# Patient Record
Sex: MALE | Race: WHITE | URBAN - METROPOLITAN AREA
[De-identification: names, ages, dates, MRNs, and addresses within clinical notes are randomized per-mention and may not be internally consistent; named-entity substitution may affect disease eponyms.]

---

## 2022-01-04 ENCOUNTER — TELEPHONE (OUTPATIENT)
Dept: DIABETES SERVICES | Age: 22
End: 2022-01-04

## 2022-01-04 NOTE — TELEPHONE ENCOUNTER
Talked with pt regarding insurance coverage. Pt cannot afford the 7990.76 remaining on his deductible or his coinsurance. Will have  send info to Maeve Sampson with [facility].  Will no longer pursue

## 2022-01-05 ENCOUNTER — DOCUMENTATION ONLY (OUTPATIENT)
Dept: CASE MANAGEMENT | Age: 22
End: 2022-01-05

## 2022-03-18 ENCOUNTER — TELEPHONE (OUTPATIENT)
Dept: NUTRITION | Age: 22
End: 2022-03-18

## 2022-03-18 NOTE — TELEPHONE ENCOUNTER
Nutrition Counseling: Contacted pt regarding referral. See notes documented in Nutrition Counseling Referral for details. No further follow-up contact from pt. Will close referral for this office.     [facility]  [phone]

## 2022-07-08 RX ORDER — INSULIN DEGLUDEC INJECTION 100 U/ML
INJECTION, SOLUTION SUBCUTANEOUS
COMMUNITY

## 2022-08-01 ENCOUNTER — CLINICAL DOCUMENTATION (OUTPATIENT)
Dept: ENDOCRINOLOGY | Age: 22
End: 2022-08-01

## 2022-08-01 NOTE — PROGRESS NOTES
Received a fax from [facility] [place] with [employer] Requesting pt's last 2 office notes, last 2 A1c's and sensor/meter download if available. Office notes from 2/21/22, and 1/21/22, Recent labs and A1c has been printed and faxed to Malachi Tuttle at [phone].

## 2022-08-03 ENCOUNTER — PATIENT MESSAGE (OUTPATIENT)
Dept: ENDOCRINOLOGY | Age: 22
End: 2022-08-03

## 2022-08-19 NOTE — TELEPHONE ENCOUNTER
Update from [facility] [place] with [employer]:    Re:  Rakesh Cloudorest [birth year]. we are working w he and parent about a Tandem pump. Father insists insurance should be covering at Tier 1 and they should not owe as much as insurance is quoting. Father is still trying to work with insurance on tier coverage. I just wanted to make you aware pt may not go forward with pump if father cannot get insurance to cover at tier 1.         [facility] [place], MSN, RN, [employer]

## 2022-08-31 NOTE — TELEPHONE ENCOUNTER
Update from Schuyler Roper:    Wanted to update you on patient Sanjana Goodwin [birth year]. His father was/is trying to get coverage for the pump under Tier 1 of his insurance. Per parent, the insurance requires Tandem to process the claim first to get tier 1 coverage. Tandem explained to father, we cannot process the claim first.  Parent requested we cancel the Tandem order because he cannot move forward unless we are able to process the claim the way insurance requests.     Schuyler Roper

## 2022-10-06 ENCOUNTER — TELEPHONE (OUTPATIENT)
Dept: ENDOCRINOLOGY | Age: 22
End: 2022-10-06

## 2022-10-06 NOTE — TELEPHONE ENCOUNTER
Patient left a message stating he would like a call back concerning some abnormal blood sugars. Left message for patient to call back concerning issue or sent message via My Chart.

## 2022-11-07 ENCOUNTER — TELEPHONE (OUTPATIENT)
Dept: ENDOCRINOLOGY | Age: 22
End: 2022-11-07

## 2022-11-07 NOTE — TELEPHONE ENCOUNTER
----- Message from [address], APRN - NP sent at 11/3/2022  1:16 PM EDT -----  Will you please contact this patient. Please ask him what his current dose for insulin is - basal and bolus for each meal/snack for the entire day? I need this information to complete his inisulin pump prescription.      Thank you,    Radha Jones

## 2022-11-08 NOTE — TELEPHONE ENCOUNTER
Left message for patient to call back. Need to know how many units of insulin does he use daily and if he is on a pump.

## 2022-11-09 NOTE — TELEPHONE ENCOUNTER
Patient returned our call and left a message. I was unable to reach him back so another message was left.

## 2022-11-11 ENCOUNTER — SCHEDULED TELEPHONE ENCOUNTER (OUTPATIENT)
Dept: ENDOCRINOLOGY | Age: 22
End: 2022-11-11

## 2022-11-11 DIAGNOSIS — Z96.41 INSULIN PUMP STATUS: ICD-10-CM

## 2022-11-11 DIAGNOSIS — E10.65 TYPE 1 DIABETES MELLITUS WITH HYPERGLYCEMIA (HCC): Primary | ICD-10-CM

## 2022-11-11 PROCEDURE — 99442 PR PHYS/QHP TELEPHONE EVALUATION 11-20 MIN: CPT | Performed by: DIETITIAN, REGISTERED

## 2022-11-11 RX ORDER — INSULIN DEGLUDEC INJECTION 100 U/ML
16 INJECTION, SOLUTION SUBCUTANEOUS EVERY MORNING
Qty: 6 ML | Refills: 11
Start: 2022-11-11

## 2022-11-11 RX ORDER — BLOOD-GLUCOSE TRANSMITTER
EACH MISCELLANEOUS
Qty: 1 EACH | Refills: 3 | Status: SHIPPED | OUTPATIENT
Start: 2022-11-11

## 2022-11-11 RX ORDER — INSULIN DEGLUDEC 200 U/ML
43 INJECTION, SOLUTION SUBCUTANEOUS DAILY
Qty: 9 ML | Refills: 11
Start: 2022-11-11 | End: 2022-11-11 | Stop reason: ALTCHOICE

## 2022-11-11 RX ORDER — BLOOD-GLUCOSE SENSOR
EACH MISCELLANEOUS
Qty: 9 EACH | Refills: 3 | Status: SHIPPED | OUTPATIENT
Start: 2022-11-11

## 2022-11-11 NOTE — PROGRESS NOTES
Autumn You is a 23 y.o. male evaluated via audio-only technology on 11/11/2022 for No chief complaint on file. Assessment & Plan:     Return in about 1 month (around 12/11/2022). 1. Type 1 diabetes mellitus with hyperglycemia (HCC)  A1C is 8.3%. Glycemic control is suboptimal. Patient has not returned to our office since January 2022, due to no insurance and financial stress. He will receive Tandem T-Slim Pump. He will have pump training on 11/15/2022. I have asked him to come in after that to review his settings. --- Dexcom and Humalog vials ordered    - Continuous Blood Gluc Sensor (DEXCOM G6 SENSOR) MISC; Change every 10 days as directed. Dispense: 9 each; Refill: 3  - Continuous Blood Gluc Transmit (DEXCOM G6 TRANSMITTER) MISC; Change every 90 days as directed. Dispense: 1 each; Refill: 3  - insulin lispro (HUMALOG) 100 UNIT/ML injection vial; For Use with Tandem T-Slim Insulin Pump. Max Daily Dose 80 units  Dispense: 80 mL; Refill: 11  - TRESIBA FLEXTOUCH 100 UNIT/ML SOPN; Inject 16 Units into the skin every morning For use only in the event of pump failure emergency  Dispense: 6 mL; Refill: 11    2. Insulin Pump Status:  Tandem T-Slim with Humalog vial  Pump settings: standard pattern- 24 hour total 32 units   Time  0:00  0.6  Carb Ratio  0:00 14      Insulin Sensitivity 53  Target low  120  Active Insulin time 4:00  Max Bolus 25 units                                   Subjective:     Home blood glucose monitoring frequency: 4 times per day    Blood glucose: by verbal review   fasting 200,    AC lunch 100,    AC supper 200,    bedtime 200      Tests or Results Reviewed: (see lab dates below in note) HgbA1C, Cr, GFR, Microalbumin/Cr ratio, Lipid Profile, TSH. History of Present Illness:        Current Diabetes Medications: Tresiba 63 units in AM, Humalog - 1-8 units SSI 2 for 50 greater than 150        New to [place] and seeking to establish with Endocrinology.         S/p COVID-19- patient reports no hospitalization was required. Started a running program and reports intermittent hypoglycemia. Date of DM diagnosis: age 4, year 2006, patient's father was severely ill in the hospital at the time patient was diagnosed. Was told  it was due to a traumatic effect of father's illness. Current Diabetes Medications: Humalog 1:8 carb ratio after eating 4-5 times daily. Tresiba 55 units in morning. Not taking humalog if  working out. Medication Failures: none       Current symptoms: See Review of Systems       Neuropathy: no s/s. Nephropathy: Stage 1 Kidney Disease   04/30/2020    Microalbumin/Cr ratio: 2.2   04/30/2020      Cr 1.1, GFR 95   12/17/2021      Cr 1.01, , microalbumin/Cr ratio 4       Retinopathy: Last eye exam was 01/2022 and demonstrated \"white dots\" early sign of diabetic retinopathy. [facility] care specialist  is [facility]. Diet:    Breakfast: skipping meals sometimes coffee only. 730 AM: rice cake, yogurt granola, cereal, coffee, water, milk   Lunch: Chicken, Rice, crackers, peach tea - 24 grams of carbohydrates, water, diet soda   Dinner: Fish, chicken, rice, Vegetables, pasta   ETOH social with friends 3 beers- Saturday, 1 glass of wine on  Friday night. Exercise:  None at this time. History of Run - 3 times weekly, 4 miles, doing more strength training        Diabetes education: The patient has received formal diabetes education years ago. Hemoglobin A1c:   04/30/2020      HgbA1C:         9.3%   11/10/2021      HgbA1C:         10.0%  01/21/2022  8.3%       C-Peptide and Fasting Glucose:   11/10/2021      C-peptide       <0.1       11/20/2021      Glucose          189                      12/17/2021      C-peptide       <0.1   12/17/2021      Fasting Glucose  67           Hypoglycemia: Aware. At [de-identified] - feels shaky, and weak and like he is in the 62s. Sweating. Lowest - 40. Random hypoglycemia into the 50s.    Lipids:    Last  at goal. Not taking a statin. 04/30/2020  TC- 127, LDL- 73, VLDL- 18,  HDL- 36, TG- 90   01/11/2022  TC- 155, LDL- 100, VLDL- 11,  HDL- 44, TG- 53       Thyroid:    12/17/2021          TSH     2.190 (0.450-4.500)       BP:                 BP Readings from Last 3 Encounters:        11/10/21  118/75             Weight Trends: Wt Readings from Last 3 Encounters:        11/10/21  182 lb (82.6 kg)             Medical/Surgical/Social/Family History: Reviewed in Chart       Medications: Reviewed in chart       Allergies   Patient has no allergy information on record. Prior to Admission medications    Medication Sig Start Date End Date Taking? Authorizing Provider   Continuous Blood Gluc Sensor (DEXCOM G6 SENSOR) MISC Change every 10 days as directed. 11/11/22  Yes ASTON Burton NP   Continuous Blood Gluc Transmit (DEXCOM G6 TRANSMITTER) MISC Change every 90 days as directed. 11/11/22  Yes ASTON Burton NP   insulin lispro (HUMALOG) 100 UNIT/ML injection vial For Use with Tandem T-Slim Insulin Pump. Max Daily Dose 80 units 11/11/22  Yes ASTON Burton NP   TRESIBA FLEXTOUCH 100 UNIT/ML SOPN Inject 16 Units into the skin every morning For use only in the event of pump failure emergency 11/11/22  Yes ASTON Story NP   Insulin Infusion Pump ALANIS Pump settings: standard pattern- 24 hour total 32 units   Time  0:00  0.6  Carb Ratio  0:00 14    Insulin Sensitivity 53  Target low  120  Active Insulin time 4:00  Max Bolus 25 units 11/11/22  Yes ASTON Burton NP   Lancets MISC E10.65 12/17/21   Ar Automatic Reconciliation   Glucagon (BAQSIMI ONE PACK) 3 MG/DOSE POWD Spray one device (3 mg) in one nostril to treat severe hypoglycemia less than 50 mg/dL. 1/21/22   Ar Automatic Reconciliation   insulin lispro, 1 Unit Dial, (HUMALOG KWIKPEN) 100 UNIT/ML SOPN Carb Ratio: 1:5 grams. ISF 18. Goal . Max Daily Dose: 40 units.   Indications: type 1 diabetes mellitus 2/21/22   Ar Automatic Reconciliation     Past Medical History:   Diagnosis Date    COVID-19     Diabetic retinopathy of both eyes associated with type 1 diabetes mellitus ([facility]     Pilonidal cyst     Type 1 diabetes ([facility] [address]      Review of Systems    No data recorded    Autumn You was evaluated through a patient-initiated, synchronous (real-time) audio only encounter. He (or guardian if applicable) is aware that it is a billable service, which includes applicable co-pays, with coverage as determined by his insurance carrier. This visit was conducted with the patient's (and/or Ezzard Door guardian's) verbal consent. He has not had a related appointment within my department in the past 7 days or scheduled within the next 24 hours. The patient was located in a state where the provider was licensed to provide care. The patient was located at: Home: [address] [facility]  The provider was located at:  Facility (Appt Dept): [address] Abigail Sotomayor [facility],  [address]    Total Time: 21 minutes    [address] [facility], APRN - NP

## 2022-11-18 NOTE — TELEPHONE ENCOUNTER
I spoke with patient. He started the pump yesterday and talked with Ivania Sumner multiple times. He states that his basal rates were changed from 0.3 (hyperglycemic) to 1.5 (hypoglycemic) to 0.9 units per hour (hyperglycemic). He took his pump off early this morning and took [place] 65 units around 10AM.  I instructed him to continue injections through the weekend. His pump settings will need to be recalculated by Jailene Carrington on Monday.

## 2022-11-18 NOTE — TELEPHONE ENCOUNTER
Patient's wife came in office stating that he has been having issues with his pump since he got it yesterday. He took the pump off early this morning. His sugars have been up and down. He began doing Humalog injections after taking the pump off. He would typically take 65 units of Tresiba around 8am and took the 65 units at 10:30 this morning. Patient would like to know how he needs to continue to do his injections until he can get his pump looked at or do an in person training, unlike the virtual training he did. Patient is concerned his pump may be defective due to his sugars going high and low.

## 2022-11-21 ENCOUNTER — TELEPHONE (OUTPATIENT)
Dept: ENDOCRINOLOGY | Age: 22
End: 2022-11-21

## 2022-11-21 DIAGNOSIS — Z96.41 INSULIN PUMP STATUS: ICD-10-CM

## 2022-11-21 DIAGNOSIS — E10.65 TYPE 1 DIABETES MELLITUS WITH HYPERGLYCEMIA (HCC): Primary | ICD-10-CM

## 2022-11-21 NOTE — TELEPHONE ENCOUNTER
1. Type 1 diabetes mellitus with hyperglycemia ([facility] [address]    2. Insulin pump status (CORRECTED SETTINGS). Pump settings: standard pattern- 24 hour total 54.5 units    Time  0:00  1.13    Carb Ratio 0:00 8      Insulin Sensitivity 31    Target low  120  Active Insulin time 4:00  Max Bolus 25 units    --- Patient will agree to restart pump settings on 11/28/2022.   --- NP contacted [employer] Rep - who will contact patient to reprogram pump. Patient says he will contact office if settings are causing hyperglycemia or hypoglycemia. --- Previous settings used higher weight of 187 lbs and a lower TDD insulin. Current weight is 180 lbs. Today's reported TDD by patient is 93 units.

## 2023-10-30 DIAGNOSIS — E10.65 TYPE 1 DIABETES MELLITUS WITH HYPERGLYCEMIA (HCC): Primary | ICD-10-CM

## 2023-10-30 RX ORDER — INSULIN LISPRO 100 [IU]/ML
INJECTION, SOLUTION INTRAVENOUS; SUBCUTANEOUS
Qty: 30 ML | Refills: 1 | Status: SHIPPED | OUTPATIENT
Start: 2023-10-30 | End: 2023-10-31

## 2023-10-30 NOTE — TELEPHONE ENCOUNTER
Will send rx to cover through visit in December. Since I've never seen him and he has not been seen in almost a year, he will need to keep this appointment to continue getting prescriptions here.      Mushtaq [place], APRN - CNP

## 2023-10-30 NOTE — TELEPHONE ENCOUNTER
Patient called to set up an appointment to get his insulin refilled due to him not having anymore. Patients pharmacy is on [facility] in [place]. He is out completely.

## 2023-10-31 RX ORDER — INSULIN LISPRO 100 [IU]/ML
INJECTION, SOLUTION INTRAVENOUS; SUBCUTANEOUS
Qty: 30 ML | Refills: 1 | Status: SHIPPED | OUTPATIENT
Start: 2023-10-31

## 2023-10-31 NOTE — TELEPHONE ENCOUNTER
Patient called today stating that the sig on his insulin, needs to be higher. The pharmacy told the patient that since he ran short on insulin this month we need to up his max daily dose to 100 units    Please advise.

## 2023-11-24 DIAGNOSIS — E10.65 TYPE 1 DIABETES MELLITUS WITH HYPERGLYCEMIA (HCC): ICD-10-CM

## 2023-11-24 RX ORDER — PROCHLORPERAZINE 25 MG/1
SUPPOSITORY RECTAL
Qty: 3 EACH | Refills: 0 | Status: SHIPPED | OUTPATIENT
Start: 2023-11-24

## 2023-11-24 NOTE — TELEPHONE ENCOUNTER
Patient called and stated that his prescription is  for her dexcom sensors and needs a new refill. Patient would like the [facility] [place].

## 2023-12-05 ENCOUNTER — TELEPHONE (OUTPATIENT)
Dept: DIABETES SERVICES | Age: 23
End: 2023-12-05

## 2023-12-05 ENCOUNTER — OFFICE VISIT (OUTPATIENT)
Dept: ENDOCRINOLOGY | Age: 23
End: 2023-12-05
Payer: COMMERCIAL

## 2023-12-05 VITALS
HEART RATE: 78 BPM | SYSTOLIC BLOOD PRESSURE: 115 MMHG | WEIGHT: 183 LBS | BODY MASS INDEX: 28.66 KG/M2 | DIASTOLIC BLOOD PRESSURE: 70 MMHG | OXYGEN SATURATION: 98 %

## 2023-12-05 DIAGNOSIS — Z96.41 INSULIN PUMP STATUS: ICD-10-CM

## 2023-12-05 DIAGNOSIS — E10.65 TYPE 1 DIABETES MELLITUS WITH HYPERGLYCEMIA (HCC): ICD-10-CM

## 2023-12-05 DIAGNOSIS — E10.65 TYPE 1 DIABETES MELLITUS WITH HYPERGLYCEMIA (HCC): Primary | ICD-10-CM

## 2023-12-05 LAB
ALBUMIN SERPL-MCNC: 4.1 G/DL (ref 3.5–5)
ALBUMIN/GLOB SERPL: 1.2 (ref 0.4–1.6)
ALP SERPL-CCNC: 49 U/L (ref 50–136)
ALT SERPL-CCNC: 52 U/L (ref 12–65)
ANION GAP SERPL CALC-SCNC: 6 MMOL/L (ref 2–11)
AST SERPL-CCNC: 30 U/L (ref 15–37)
BASOPHILS # BLD: 0.1 K/UL (ref 0–0.2)
BASOPHILS NFR BLD: 1 % (ref 0–2)
BILIRUB SERPL-MCNC: 0.4 MG/DL (ref 0.2–1.1)
BUN SERPL-MCNC: 17 MG/DL (ref 6–23)
CALCIUM SERPL-MCNC: 9 MG/DL (ref 8.3–10.4)
CHLORIDE SERPL-SCNC: 103 MMOL/L (ref 103–113)
CHOLEST SERPL-MCNC: 170 MG/DL
CO2 SERPL-SCNC: 27 MMOL/L (ref 21–32)
CREAT SERPL-MCNC: 1.1 MG/DL (ref 0.8–1.5)
CREAT UR-MCNC: 111 MG/DL
DIFFERENTIAL METHOD BLD: NORMAL
EOSINOPHIL # BLD: 0.3 K/UL (ref 0–0.8)
EOSINOPHIL NFR BLD: 4 % (ref 0.5–7.8)
ERYTHROCYTE [DISTWIDTH] IN BLOOD BY AUTOMATED COUNT: 11.9 % (ref 11.9–14.6)
GLOBULIN SER CALC-MCNC: 3.4 G/DL (ref 2.8–4.5)
GLUCOSE SERPL-MCNC: 125 MG/DL (ref 65–100)
HCT VFR BLD AUTO: 48.4 % (ref 41.1–50.3)
HDLC SERPL-MCNC: 41 MG/DL (ref 40–60)
HDLC SERPL: 4.1
HGB BLD-MCNC: 16.5 G/DL (ref 13.6–17.2)
IMM GRANULOCYTES # BLD AUTO: 0 K/UL (ref 0–0.5)
IMM GRANULOCYTES NFR BLD AUTO: 0 % (ref 0–5)
LDLC SERPL CALC-MCNC: 110 MG/DL
LYMPHOCYTES # BLD: 2.1 K/UL (ref 0.5–4.6)
LYMPHOCYTES NFR BLD: 28 % (ref 13–44)
MCH RBC QN AUTO: 30.8 PG (ref 26.1–32.9)
MCHC RBC AUTO-ENTMCNC: 34.1 G/DL (ref 31.4–35)
MCV RBC AUTO: 90.3 FL (ref 82–102)
MICROALBUMIN UR-MCNC: <0.5 MG/DL
MICROALBUMIN/CREAT UR-RTO: NORMAL MG/G (ref 0–30)
MONOCYTES # BLD: 0.5 K/UL (ref 0.1–1.3)
MONOCYTES NFR BLD: 7 % (ref 4–12)
NEUTS SEG # BLD: 4.5 K/UL (ref 1.7–8.2)
NEUTS SEG NFR BLD: 60 % (ref 43–78)
NRBC # BLD: 0 K/UL (ref 0–0.2)
PLATELET # BLD AUTO: 295 K/UL (ref 150–450)
PMV BLD AUTO: 10.5 FL (ref 9.4–12.3)
POTASSIUM SERPL-SCNC: 3.8 MMOL/L (ref 3.5–5.1)
PROT SERPL-MCNC: 7.5 G/DL (ref 6.3–8.2)
RBC # BLD AUTO: 5.36 M/UL (ref 4.23–5.6)
SODIUM SERPL-SCNC: 136 MMOL/L (ref 136–146)
TRIGL SERPL-MCNC: 95 MG/DL (ref 35–150)
TSH W FREE THYROID IF ABNORMAL: 1.32 UIU/ML (ref 0.36–3.74)
VLDLC SERPL CALC-MCNC: 19 MG/DL (ref 6–23)
WBC # BLD AUTO: 7.5 K/UL (ref 4.3–11.1)

## 2023-12-05 PROCEDURE — 83036 HEMOGLOBIN GLYCOSYLATED A1C: CPT | Performed by: NURSE PRACTITIONER

## 2023-12-05 PROCEDURE — 99215 OFFICE O/P EST HI 40 MIN: CPT | Performed by: NURSE PRACTITIONER

## 2023-12-05 PROCEDURE — 95251 CONT GLUC MNTR ANALYSIS I&R: CPT | Performed by: NURSE PRACTITIONER

## 2023-12-05 PROCEDURE — 99417 PROLNG OP E/M EACH 15 MIN: CPT | Performed by: NURSE PRACTITIONER

## 2023-12-05 RX ORDER — INSULIN LISPRO 100 [IU]/ML
INJECTION, SOLUTION INTRAVENOUS; SUBCUTANEOUS
Qty: 108 ML | Refills: 3 | Status: SHIPPED | OUTPATIENT
Start: 2023-12-05

## 2023-12-05 RX ORDER — PROCHLORPERAZINE 25 MG/1
SUPPOSITORY RECTAL
Qty: 1 EACH | Refills: 3 | Status: SHIPPED | OUTPATIENT
Start: 2023-12-05

## 2023-12-05 RX ORDER — PROCHLORPERAZINE 25 MG/1
SUPPOSITORY RECTAL
Qty: 9 EACH | Refills: 3 | Status: SHIPPED | OUTPATIENT
Start: 2023-12-05

## 2023-12-05 ASSESSMENT — ENCOUNTER SYMPTOMS
ABDOMINAL PAIN: 1
CONSTIPATION: 0
DIARRHEA: 0

## 2023-12-05 NOTE — TELEPHONE ENCOUNTER
Type one. Patient interested. Let him know no cost.  He wants to talk to wife to see December or January. Gave him class dates for both months. He will call us back. Phone number provided.

## 2023-12-06 LAB
HBA1C MFR BLD: 7.8 %
UREA BREATH TEST QL: NEGATIVE

## 2023-12-11 ENCOUNTER — TELEPHONE (OUTPATIENT)
Dept: DIABETES SERVICES | Age: 23
End: 2023-12-11

## 2023-12-11 NOTE — TELEPHONE ENCOUNTER
Second call about DM education. Type One. He wants January and provided January Type One class dates. He wants us to call him again in January.   Defer referral.

## 2024-01-03 ENCOUNTER — TELEPHONE (OUTPATIENT)
Dept: DIABETES SERVICES | Age: 24
End: 2024-01-03

## 2024-01-03 NOTE — TELEPHONE ENCOUNTER
Type One,  Pt requested to be called in January.  Called and gave him Type One class dates for January and February.  He said February will probably will be better.  He wants to talk to his wife and call us back.

## 2024-01-09 ENCOUNTER — TELEPHONE (OUTPATIENT)
Dept: DIABETES SERVICES | Age: 24
End: 2024-01-09

## 2024-01-09 NOTE — TELEPHONE ENCOUNTER
Type One.  Left message to see if decided about DM education.  Let him know free and left call back number to schedule when ready. Second call in January.  He has been given dates for January and February for Type One.

## 2024-01-30 ASSESSMENT — ENCOUNTER SYMPTOMS
DIARRHEA: 0
ABDOMINAL PAIN: 1
CONSTIPATION: 0

## 2024-01-30 NOTE — PROGRESS NOTES
Co-morbid Diagnoses:   Cardiac: no history MI/CAD/CVA  Current therapy: This patient does not have an active medication from one of the medication groupers.     The ASCVD Risk score (Danielito SALCIDO, et al., 2019) failed to calculate for the following reasons:    The 2019 ASCVD risk score is only valid for ages 40 to 79    Renal:   Under care of nephro? No   Not on Ace-I/ARB This patient does not have an active medication from one of the medication groupers.    Computed KFRE 2-Year unavailable. Necessary lab results were not found in the last year.      Liver:   History of hepatitis: denies; excessive alcohol consumption: denies      Pertinent Lab History:    Hemoglobin A1c:   2020: 9.3%   11/10/2021: 10.0%   2022: 8.3%   2023: 7.8%     Lipids:    2022: TC: 155; HDL: 44; LDL: 100; T; VLDL: 11   2023: TC: 170; HDL: 41; LDL: 110; T; VLDL: 19    Thyroid:    2021: TSH: 2.190   2023: TSH: 1.32    Renal:    2021: Creatinine: 1.01; eGFR: 104   2023: Creatinine: 1.10; eGFR: >60, uACR: cannot calc    Liver Function:   2021: ALT: 21; AST: 18   2023: ALT: 52; AST: 30, Platelets: 295    Miscellaneous Labs:    C-Peptide:   11/10/2021: <0.1 (fasting glucose: 189)  2021: <0.1 (fasting glucose: 67)      Review of Systems   Constitutional:  Negative for appetite change, fatigue and unexpected weight change.   Eyes:  Negative for visual disturbance.   Cardiovascular:  Negative for palpitations.   Gastrointestinal:  Positive for abdominal pain. Negative for constipation and diarrhea.        Positive for reflux, eructation, bloating   Endocrine: Negative for polydipsia, polyphagia and polyuria.   Skin:  Negative for rash and wound.   Neurological:  Negative for dizziness and light-headedness.   Psychiatric/Behavioral:  Negative for dysphoric mood and sleep disturbance. The patient is not nervous/anxious.        /71 (Site: Left Upper Arm, Position:

## 2024-01-31 ENCOUNTER — OFFICE VISIT (OUTPATIENT)
Dept: ENDOCRINOLOGY | Age: 24
End: 2024-01-31

## 2024-01-31 VITALS
BODY MASS INDEX: 28.57 KG/M2 | WEIGHT: 182.4 LBS | DIASTOLIC BLOOD PRESSURE: 71 MMHG | OXYGEN SATURATION: 97 % | HEART RATE: 86 BPM | SYSTOLIC BLOOD PRESSURE: 121 MMHG

## 2024-01-31 DIAGNOSIS — E78.00 TYPE 1 DIABETES MELLITUS WITH HYPERCHOLESTEROLEMIA (HCC): ICD-10-CM

## 2024-01-31 DIAGNOSIS — Z96.41 INSULIN PUMP STATUS: ICD-10-CM

## 2024-01-31 DIAGNOSIS — E10.65 TYPE 1 DIABETES MELLITUS WITH HYPERGLYCEMIA (HCC): Primary | ICD-10-CM

## 2024-01-31 DIAGNOSIS — E10.69 TYPE 1 DIABETES MELLITUS WITH HYPERCHOLESTEROLEMIA (HCC): ICD-10-CM

## 2024-01-31 RX ORDER — ESOMEPRAZOLE MAGNESIUM 20 MG/1
20 GRANULE, DELAYED RELEASE ORAL DAILY
COMMUNITY

## 2024-01-31 NOTE — PATIENT INSTRUCTIONS
Recommendations for lowering LDL include eat heart-healthy foods, increase omega-3 fatty acids, add whey protein, increase physical activity, quit smoking, lose weight and reduce alcohol intake.

## 2024-02-07 ENCOUNTER — TELEPHONE (OUTPATIENT)
Dept: ENDOCRINOLOGY | Age: 24
End: 2024-02-07

## 2024-02-07 DIAGNOSIS — E10.65 TYPE 1 DIABETES MELLITUS WITH HYPERGLYCEMIA (HCC): ICD-10-CM

## 2024-02-07 RX ORDER — INSULIN LISPRO 100 [IU]/ML
INJECTION, SOLUTION INTRAVENOUS; SUBCUTANEOUS
Qty: 108 ML | Refills: 3 | Status: SHIPPED | OUTPATIENT
Start: 2024-02-07

## 2024-02-07 NOTE — TELEPHONE ENCOUNTER
[facility] called stating Pt is out of his Humalog. We sent a Rx being OCTAVIO and PT has always been get the generic. Can we switch to generic for his Humalog? If so please send new Rx without OCTAVIO, please. Any questions please contact Cosme with [facility] at [phone].

## 2024-07-30 ASSESSMENT — ENCOUNTER SYMPTOMS
DIARRHEA: 0
ABDOMINAL PAIN: 1
CONSTIPATION: 0

## 2024-07-30 NOTE — PROGRESS NOTES
[facility], APRN-[facility]  [address], [address]  [place] [address]            Tramaine Marroquin is seen today for follow up of type 1 diabetes mellitus.      ASSESSMENT AND PLAN:    Interpretation of 72 hour glucose monitor:  At least 72 hours of data were reviewed.  The patient utilizes a Dexcom G6 continuous glucose monitoring system.  The average glucose during the reviewed timeframe was 190 with a coefficient of variation of 37% (time in range 52%, time above range 47%, time below range 1%).     1. Type 1 diabetes mellitus with hyperglycemia (HCC)  A1c 8.1%, GMI: 7.9%. Glycemic control sub-optimal with A1c above goal of less than 7%.  Strengthen correction factors by 10%. No other setting changes. He is going to work on his diet.     - AMB POC HEMOGLOBIN A1C  - IA CONTINUOUS GLUCOSE MONITORING ANALYSIS I&R    2. Insulin pump status  Pump setting changes as below. Long-acting insulin on chart for pump failure.    - Insulin Infusion Pump ALANIS; Basal Rates 0000: 1.1; 1200: 1.25  Carb Ratio 0000: 7  Correction Factor: 0000: 23, 1200: 20  Targets  0000: 120  Active Insulin time 5 hrs  Max Bolus 25 units  Max basal rate: 4 u/hr  Control IQ: weight: 183; TDD: 100  Dispense: 1 each; Refill: 0          Return in about 6 months (around 1/31/2025).     History of Present Illness:  Interim medical updates: doing well but admits he has slipped on his diet a bit.    Barriers to care: none identified    Date of diagnosis: 2006 at age 6    Patient has no history of previous DKA episodes.  Denies HX of pancreatitis, gastroparesis, foot ulcer, anemia.     Current Regimen: Humalog via AID    Pump: Tandem tslim with Control IQ    Pump issues: none    Pump Data:    TDD: 82.71 units  Basal: 36.75 units: 44%  Bolus: 45.97 units, 66%  Food: 26.41 units, 57%; Correction: 2.91 units, 6%; Control IQ: 11.06 units, 24%, Overrides: 12%  Control IQ off: 0%; Pump inactive: 5%, CGM inactive: 11%    Pump

## 2024-07-31 ENCOUNTER — OFFICE VISIT (OUTPATIENT)
Dept: ENDOCRINOLOGY | Age: 24
End: 2024-07-31
Payer: COMMERCIAL

## 2024-07-31 VITALS
SYSTOLIC BLOOD PRESSURE: 120 MMHG | BODY MASS INDEX: 29.13 KG/M2 | WEIGHT: 186 LBS | DIASTOLIC BLOOD PRESSURE: 76 MMHG | HEART RATE: 82 BPM

## 2024-07-31 DIAGNOSIS — E10.65 TYPE 1 DIABETES MELLITUS WITH HYPERGLYCEMIA (HCC): Primary | ICD-10-CM

## 2024-07-31 DIAGNOSIS — Z96.41 INSULIN PUMP STATUS: ICD-10-CM

## 2024-07-31 LAB — HBA1C MFR BLD: 8.1 %

## 2024-07-31 PROCEDURE — 99214 OFFICE O/P EST MOD 30 MIN: CPT | Performed by: NURSE PRACTITIONER

## 2024-07-31 PROCEDURE — 83036 HEMOGLOBIN GLYCOSYLATED A1C: CPT | Performed by: NURSE PRACTITIONER

## 2024-07-31 PROCEDURE — 95251 CONT GLUC MNTR ANALYSIS I&R: CPT | Performed by: NURSE PRACTITIONER

## 2024-12-09 ENCOUNTER — TELEPHONE (OUTPATIENT)
Dept: ENDOCRINOLOGY | Age: 24
End: 2024-12-09

## 2024-12-09 NOTE — TELEPHONE ENCOUNTER
Patient called stating his shipment has been delayed because insurance messed up and he is asking for sample of the autosoft 30 or autosoft 90 infusion set.

## 2024-12-20 DIAGNOSIS — E10.65 TYPE 1 DIABETES MELLITUS WITH HYPERGLYCEMIA (HCC): ICD-10-CM

## 2024-12-23 ENCOUNTER — TELEPHONE (OUTPATIENT)
Dept: ENDOCRINOLOGY | Age: 24
End: 2024-12-23

## 2024-12-23 RX ORDER — PROCHLORPERAZINE 25 MG/1
SUPPOSITORY RECTAL
Qty: 1 EACH | Refills: 0 | Status: SHIPPED | OUTPATIENT
Start: 2024-12-23

## 2024-12-23 RX ORDER — PROCHLORPERAZINE 25 MG/1
SUPPOSITORY RECTAL
Qty: 9 EACH | Refills: 3 | Status: SHIPPED | OUTPATIENT
Start: 2024-12-23

## 2024-12-23 NOTE — TELEPHONE ENCOUNTER
Patient called requesting refills for DEXCOM G6 SENSOR and DEXCOM G6 TRANSMITTER to [facility] [place]. Pt stated Rx has . Requested call back with any questions or issues.

## 2025-02-12 DIAGNOSIS — E10.65 TYPE 1 DIABETES MELLITUS WITH HYPERGLYCEMIA (HCC): ICD-10-CM

## 2025-02-12 RX ORDER — INSULIN LISPRO 100 [IU]/ML
INJECTION, SOLUTION INTRAVENOUS; SUBCUTANEOUS
Qty: 20 ML | Refills: 0 | Status: SHIPPED | OUTPATIENT
Start: 2025-02-12

## 2025-03-04 DIAGNOSIS — E10.65 TYPE 1 DIABETES MELLITUS WITH HYPERGLYCEMIA (HCC): ICD-10-CM

## 2025-03-04 RX ORDER — INSULIN LISPRO 100 [IU]/ML
INJECTION, SOLUTION INTRAVENOUS; SUBCUTANEOUS
Qty: 20 ML | Refills: 0 | OUTPATIENT
Start: 2025-03-04

## 2025-03-21 DIAGNOSIS — E10.65 TYPE 1 DIABETES MELLITUS WITH HYPERGLYCEMIA (HCC): ICD-10-CM

## 2025-03-21 RX ORDER — INSULIN LISPRO 100 [IU]/ML
INJECTION, SOLUTION INTRAVENOUS; SUBCUTANEOUS
Qty: 20 ML | Refills: 0 | Status: SHIPPED | OUTPATIENT
Start: 2025-03-21

## 2025-03-21 NOTE — TELEPHONE ENCOUNTER
Pt not seen since 7/31/24, no follow up on file. Please call pt and set follow up then send back to me for short term refill

## 2025-03-28 NOTE — PROGRESS NOTES
[facility], APRN-[facility]  [address], [address]  [place] [address]            Tramaine Marroquin is seen today for follow up of type 1 diabetes mellitus.      ASSESSMENT AND PLAN:    Interpretation of 72 hour glucose monitor:  At least 72 hours of data were reviewed.  The patient utilizes a Dexcom G6 continuous glucose monitoring system.  The average glucose during the reviewed timeframe was 192 with a coefficient of variation of 31% (time in range 50%, time above range 50%, time below range 0%).     1. Type 1 diabetes mellitus with hyperglycemia (HCC)  Assessment & Plan:  A1c 8.2%, GMI: 7.9%. Glycemic control sub-optimal with A1c above goal of less than 7%. No real change since last visit.   He needs a PA for Dexcom. Will upgrade to G7. Patient advised will need to update his pump software before it will be compatible with G7.   Offered setting changes but he declines today. His software update will include Control IQ+ which will likely improve his control.   Extensive counseling on weight loss. His current diet is high in take-out. Recommended mediterranean diet, low fat, low fried foot, higher water intake.    Due for routine lab work. Orders placed today.   Foot exam politely declined by patient.   Patient is due for his annual diabetic eye exam. Counseled on optimizing glycemic control, blood pressure and cholesterol to reduce risk or slow progression of diabetic retinopathy. They have an upcoming appointment.    Orders:  -     Comprehensive Metabolic Panel; Future  -     Lipid Panel; Future  -     Albumin/Creatinine Ratio, Urine; Future  -     TSH reflex to FT4; Future  -     CBC; Future  -     AMB POC HEMOGLOBIN A1C  -     Continuous Glucose Sensor (DEXCOM G7 SENSOR) MISC; Change every 10 days to monitor blood glucose, E10.65, Disp-9 each, R-3, DAWNormal  -     insulin lispro (HUMALOG,ADMELOG) 100 UNIT/ML SOLN injection vial; USE WITH INSULIN PUMP, MAX DAILY DOSE:120

## 2025-03-31 ENCOUNTER — OFFICE VISIT (OUTPATIENT)
Dept: ENDOCRINOLOGY | Age: 25
End: 2025-03-31
Payer: COMMERCIAL

## 2025-03-31 ENCOUNTER — TELEPHONE (OUTPATIENT)
Dept: ENDOCRINOLOGY | Age: 25
End: 2025-03-31

## 2025-03-31 VITALS
HEIGHT: 67 IN | BODY MASS INDEX: 31.39 KG/M2 | WEIGHT: 200 LBS | OXYGEN SATURATION: 98 % | DIASTOLIC BLOOD PRESSURE: 80 MMHG | SYSTOLIC BLOOD PRESSURE: 122 MMHG | HEART RATE: 77 BPM

## 2025-03-31 DIAGNOSIS — E10.65 TYPE 1 DIABETES MELLITUS WITH HYPERGLYCEMIA (HCC): Primary | ICD-10-CM

## 2025-03-31 PROBLEM — E10.10 DIABETIC KETOACIDOSIS WITHOUT COMA ASSOCIATED WITH TYPE 1 DIABETES MELLITUS: Status: ACTIVE | Noted: 2024-11-12

## 2025-03-31 PROBLEM — Z86.39 HISTORY OF DIABETIC KETOACIDOSIS: Status: ACTIVE | Noted: 2024-11-12

## 2025-03-31 LAB — HBA1C MFR BLD: 8.2 %

## 2025-03-31 PROCEDURE — 83036 HEMOGLOBIN GLYCOSYLATED A1C: CPT | Performed by: NURSE PRACTITIONER

## 2025-03-31 PROCEDURE — 3052F HG A1C>EQUAL 8.0%<EQUAL 9.0%: CPT | Performed by: NURSE PRACTITIONER

## 2025-03-31 PROCEDURE — 95251 CONT GLUC MNTR ANALYSIS I&R: CPT | Performed by: NURSE PRACTITIONER

## 2025-03-31 PROCEDURE — 99214 OFFICE O/P EST MOD 30 MIN: CPT | Performed by: NURSE PRACTITIONER

## 2025-03-31 RX ORDER — INSULIN LISPRO 100 [IU]/ML
INJECTION, SOLUTION INTRAVENOUS; SUBCUTANEOUS
Qty: 108 ML | Refills: 3 | Status: SHIPPED | OUTPATIENT
Start: 2025-03-31

## 2025-03-31 RX ORDER — ACYCLOVIR 400 MG/1
TABLET ORAL
Qty: 9 EACH | Refills: 3 | Status: SHIPPED | OUTPATIENT
Start: 2025-03-31

## 2025-03-31 NOTE — ASSESSMENT & PLAN NOTE
A1c 8.2%, GMI: 7.9%. Glycemic control sub-optimal with A1c above goal of less than 7%. No real change since last visit.   He needs a PA for Dexcom. Will upgrade to G7. Patient advised will need to update his pump software before it will be compatible with G7.   Offered setting changes but he declines today. His software update will include Control IQ+ which will likely improve his control.   Extensive counseling on weight loss. His current diet is high in take-out. Recommended mediterranean diet, low fat, low fried foot, higher water intake.    Due for routine lab work. Orders placed today.   Foot exam politely declined by patient.   Patient is due for his annual diabetic eye exam. Counseled on optimizing glycemic control, blood pressure and cholesterol to reduce risk or slow progression of diabetic retinopathy. They have an upcoming appointment.